# Patient Record
Sex: MALE | Race: BLACK OR AFRICAN AMERICAN | NOT HISPANIC OR LATINO | Employment: UNEMPLOYED | URBAN - METROPOLITAN AREA
[De-identification: names, ages, dates, MRNs, and addresses within clinical notes are randomized per-mention and may not be internally consistent; named-entity substitution may affect disease eponyms.]

---

## 2019-05-21 ENCOUNTER — HOSPITAL ENCOUNTER (EMERGENCY)
Facility: HOSPITAL | Age: 39
Discharge: HOME | End: 2019-05-21
Attending: EMERGENCY MEDICINE
Payer: MEDICAID

## 2019-05-21 VITALS
RESPIRATION RATE: 16 BRPM | HEART RATE: 85 BPM | BODY MASS INDEX: 29.1 KG/M2 | TEMPERATURE: 99 F | OXYGEN SATURATION: 99 % | SYSTOLIC BLOOD PRESSURE: 147 MMHG | HEIGHT: 68 IN | WEIGHT: 192 LBS | DIASTOLIC BLOOD PRESSURE: 102 MMHG

## 2019-05-21 DIAGNOSIS — M25.569 KNEE PAIN: Primary | ICD-10-CM

## 2019-05-21 PROCEDURE — 63600175 PHARM REV CODE 636 W HCPCS: Performed by: PHYSICIAN ASSISTANT

## 2019-05-21 PROCEDURE — 29505 APPLICATION LONG LEG SPLINT: CPT | Mod: LT

## 2019-05-21 PROCEDURE — 96372 THER/PROPH/DIAG INJ SC/IM: CPT | Mod: 59

## 2019-05-21 PROCEDURE — 99284 EMERGENCY DEPT VISIT MOD MDM: CPT | Mod: 25

## 2019-05-21 RX ORDER — HYDROCODONE BITARTRATE AND ACETAMINOPHEN 5; 325 MG/1; MG/1
1 TABLET ORAL EVERY 6 HOURS PRN
Qty: 10 TABLET | Refills: 0 | Status: SHIPPED | OUTPATIENT
Start: 2019-05-21

## 2019-05-21 RX ORDER — HYDROMORPHONE HYDROCHLORIDE 2 MG/ML
0.5 INJECTION, SOLUTION INTRAMUSCULAR; INTRAVENOUS; SUBCUTANEOUS
Status: COMPLETED | OUTPATIENT
Start: 2019-05-21 | End: 2019-05-21

## 2019-05-21 RX ORDER — IBUPROFEN 600 MG/1
600 TABLET ORAL EVERY 6 HOURS PRN
Qty: 20 TABLET | Refills: 0 | Status: SHIPPED | OUTPATIENT
Start: 2019-05-21 | End: 2019-05-26

## 2019-05-21 RX ADMIN — HYDROMORPHONE HYDROCHLORIDE 0.5 MG: 2 INJECTION, SOLUTION INTRAMUSCULAR; INTRAVENOUS; SUBCUTANEOUS at 11:05

## 2019-05-21 NOTE — ED TRIAGE NOTES
"Arrived via personal transportation. Reports left knee injury yesterday while playing softball. Pt reports falling on knee. States "It popped and everything".   "

## 2019-05-21 NOTE — ED PROVIDER NOTES
"Encounter Date: 5/21/2019       History     Chief Complaint   Patient presents with    Knee Injury     states injured left knee during soft ball yesterday     Chief Complaint:  Knee pain  History of  Present Illness: History obtained from patient. This 38 y.o. male who has past medical history of hypertension presents to the ED complaining of left knee pain that began yesterday while playing softball.  Patient states he shifted weight suddenly onto his left leg and heard a "pop" sound.  He states he has been unable to bear weight on the left lower extremity.  Pain is 10/10.  No prior treatment for pain. Denies numbness, tingling, weakness, fever or chills.        Review of patient's allergies indicates:  No Known Allergies  Past Medical History:   Diagnosis Date    Hypertension      No past surgical history on file.  History reviewed. No pertinent family history.  Social History     Tobacco Use    Smoking status: Current Every Day Smoker     Types: Cigarettes   Substance Use Topics    Alcohol use: Not Currently    Drug use: Yes     Types: Marijuana     Review of Systems   Constitutional: Negative for chills and fever.   HENT: Negative for congestion, rhinorrhea and sore throat.    Eyes: Negative for visual disturbance.   Respiratory: Negative for cough and shortness of breath.    Cardiovascular: Negative for chest pain.   Gastrointestinal: Negative for abdominal pain, diarrhea, nausea and vomiting.   Genitourinary: Negative for dysuria, frequency and hematuria.   Musculoskeletal: Positive for arthralgias (Left knee). Negative for back pain.   Skin: Negative for rash.   Neurological: Negative for dizziness, weakness, numbness and headaches.       Physical Exam     Initial Vitals [05/21/19 1013]   BP Pulse Resp Temp SpO2   (!) 148/96 83 18 98.4 °F (36.9 °C) 99 %      MAP       --         Physical Exam    Nursing note and vitals reviewed.  Constitutional: He appears well-developed and well-nourished. No distress. "   HENT:   Head: Normocephalic and atraumatic.   Eyes: EOM are normal. Pupils are equal, round, and reactive to light.   Neck: Normal range of motion. Neck supple.   Cardiovascular: Normal rate, regular rhythm and normal heart sounds. Exam reveals no gallop and no friction rub.    No murmur heard.  Pulmonary/Chest: Breath sounds normal. No respiratory distress. He has no wheezes. He has no rhonchi. He has no rales.   Abdominal: Soft. Bowel sounds are normal. He exhibits no mass. There is no tenderness. There is no rebound and no guarding.   Musculoskeletal:        Left knee: He exhibits decreased range of motion and swelling. He exhibits no effusion, no ecchymosis and no deformity. Tenderness found. MCL tenderness noted.   There is ligamentous laxity with anterior drawer test.  There is pain to the medial aspect of the left knee with valgus stress.  Patient has intact range of motion of the left knee but is limited secondary to pain   Neurological: He is alert and oriented to person, place, and time.   Skin: Skin is warm and dry.   Psychiatric: He has a normal mood and affect.         ED Course   Procedures  Labs Reviewed - No data to display       Imaging Results          X-Ray Knee 3 View Left (Final result)  Result time 05/21/19 10:54:46    Final result by Jim Nelson MD (05/21/19 10:54:46)                 Impression:      See above      Electronically signed by: Jim Nelson MD  Date:    05/21/2019  Time:    10:54             Narrative:    EXAMINATION:  XR KNEE 3 VIEW LEFT    CLINICAL HISTORY:  Pain in unspecified knee    TECHNIQUE:  AP, lateral, and Merchant views of the left knee were performed.    COMPARISON:  None    FINDINGS:  No fracture or dislocation.  No bone destruction identified                                 Medical Decision Making:   ED Management:  This is an evaluation of a 38 y.o. male who presents to the ED for left knee pain.  Vital signs are stable.   Afebrile.  Patient is nontoxic  appearing and in no acute distress. Patient has tenderness to the anterior and medial aspect of the left knee.  Patient does not have pain with micro motion. There is minimal erythema to the medial aspect of the left knee.  There is ligamentous laxity with anterior drawer test.  Pain is reproducible with valgus stress.  X-ray of the left knee shows no acute fracture or dislocation.  Physical exam findings are concerning for ligamentous injury. Will discharge patient home with knee immobilizer.  Patient encouraged to follow up with Orthopedics.    Patient given return precautions and instructed to return to the emergency department for any new or worsening symptoms. Patient verbalized understanding and agreed with plan.     I discussed the case with Dr. Kimble who is in agreement with my assessment and plan.                        Clinical Impression:       ICD-10-CM ICD-9-CM   1. Knee pain M25.569 719.46                                Abram Hood PA-C  05/21/19 1741